# Patient Record
Sex: FEMALE | Race: WHITE | NOT HISPANIC OR LATINO | URBAN - METROPOLITAN AREA
[De-identification: names, ages, dates, MRNs, and addresses within clinical notes are randomized per-mention and may not be internally consistent; named-entity substitution may affect disease eponyms.]

---

## 2021-04-26 ENCOUNTER — OFFICE VISIT (OUTPATIENT)
Dept: URGENT CARE | Facility: CLINIC | Age: 36
End: 2021-04-26
Payer: COMMERCIAL

## 2021-04-26 VITALS
TEMPERATURE: 97.5 F | RESPIRATION RATE: 16 BRPM | HEART RATE: 78 BPM | HEIGHT: 64 IN | OXYGEN SATURATION: 100 % | DIASTOLIC BLOOD PRESSURE: 72 MMHG | SYSTOLIC BLOOD PRESSURE: 104 MMHG | BODY MASS INDEX: 24.92 KG/M2 | WEIGHT: 146 LBS

## 2021-04-26 DIAGNOSIS — L25.5 DERMATITIS DUE TO PLANTS, INCLUDING POISON IVY, SUMAC, AND OAK: Primary | ICD-10-CM

## 2021-04-26 PROCEDURE — 99203 OFFICE O/P NEW LOW 30 MIN: CPT | Performed by: FAMILY MEDICINE

## 2021-04-26 RX ORDER — TRIAMCINOLONE ACETONIDE 5 MG/G
CREAM TOPICAL
Qty: 30 G | Refills: 0 | Status: SHIPPED | OUTPATIENT
Start: 2021-04-26

## 2021-04-26 RX ORDER — CEPHALEXIN 500 MG/1
500 CAPSULE ORAL EVERY 12 HOURS SCHEDULED
Qty: 14 CAPSULE | Refills: 0 | Status: SHIPPED | OUTPATIENT
Start: 2021-04-26 | End: 2021-05-03

## 2021-04-26 RX ORDER — PREDNISONE 20 MG/1
TABLET ORAL
COMMUNITY
Start: 2021-04-21

## 2021-04-26 NOTE — PATIENT INSTRUCTIONS
1  Plant dermatitis of the right forearm   - continue and complete the oral Prednisone as prescribed   - topical Triamcinolone cream prescribed, apply to the affected area twice daily for no longer than 1 week   - may apply topical Neosporin to the site  - may take oral Benadryl at bedtime as needed for itching  - Keflex (antibiotic) prescribed to help prevent infection of the affected area   - gently wash the area with soap and water daily and keep clean   - follow up w/ PCP for re-check in 3-5 days   - if symptoms acutely worsen/spread, or any new symptoms present, patient is to be seen in the ER

## 2021-04-26 NOTE — PROGRESS NOTES
[facility]        NAME: James Roberts is a 43 y o  female  : 1985    MRN: [id number]  DATE: 2021  TIME: 1:14 PM    Assessment and Plan   Dermatitis due to plants, including poison ivy, sumac, and oak [L25 5]  1  Dermatitis due to plants, including poison ivy, sumac, and oak  cephalexin (KEFLEX) 500 mg capsule    triamcinolone (KENALOG) 0 5 % cream         Patient Instructions     Patient Instructions   1  Plant dermatitis of the right forearm   - continue and complete the oral Prednisone as prescribed   - topical Triamcinolone cream prescribed, apply to the affected area twice daily for no longer than 1 week   - may apply topical Neosporin to the site  - may take oral Benadryl at bedtime as needed for itching  - Keflex (antibiotic) prescribed to help prevent infection of the affected area   - gently wash the area with soap and water daily and keep clean   - follow up w/ PCP for re-check in 3-5 days   - if symptoms acutely worsen/spread, or any new symptoms present, patient is to be seen in the ER  Follow up with PCP in 3-5 days  Proceed to  ER if symptoms worsen  Chief Complaint     Chief Complaint   Patient presents with    Rash     poison ivy on right forearm         History of Present Illness       42 yo female presents for a rash on her right forearm that has been present for 1 week  She states the rash began the day after she was working in her garden and came in contact with [address]  She states she was seen by a telemedicine doctor the next day and was prescribed a course of oral steroids, 40 mg daily x 4 days, and 20 mg daily x 4 days  She has 1 day worth of steroids left  She states she notes no improvement in the rash and feels it is worsening  She states the rash is red, itchy, and oozing a clear yellowish fluid  She also states the area feels warm and is mildly painful  She denies any drainage of pus from the site  No fever/chills   No numbness/tingling or weakness of the arm  She is able to move the arm in full ROM  She has no known medication allergies  She denies any chance of pregnancy  Review of Systems   Review of Systems   Constitutional: Negative  HENT: Negative  Eyes: Negative  Respiratory: Negative  Cardiovascular: Negative  Gastrointestinal: Negative  Musculoskeletal: Negative  Skin:        As noted in HPI   Allergic/Immunologic: Negative  Neurological: Negative  Hematological: Negative  Current Medications       Current Outpatient Medications:     Norethindrone Acet-Ethinyl Est (AUROVELA 1/20 PO), Take by mouth, Disp: , Rfl:     predniSONE 20 mg tablet, TAKE 2 TABLETS BY MOUTH EVERY DAY FOR 4 DAYS THEN TAKE 1 TABLET BY MOUTH EVERY DAY FOR 4 DAYS, Disp: , Rfl:     cephalexin (KEFLEX) 500 mg capsule, Take 1 capsule (500 mg total) by mouth every 12 (twelve) hours for 7 days, Disp: 14 capsule, Rfl: 0    triamcinolone (KENALOG) 0 5 % cream, Apply topically to the affected area twice daily for 1 week, Disp: 30 g, Rfl: 0    Current Allergies     Allergies as of 04/26/2021    (No Known Allergies)            The following portions of the patient's history were reviewed and updated as appropriate: allergies, current medications, past family history, past medical history, past social history, past surgical history and problem list      Past Medical History:   Diagnosis Date    Patient denies medical problems        Past Surgical History:   Procedure Laterality Date    NO PAST SURGERIES         Family History   Problem Relation Age of Onset    No Known Problems Mother     No Known Problems Father          Medications have been verified  Objective   /72 (BP Location: Right arm, Patient Position: Sitting, Cuff Size: Standard)   Pulse 78   Temp 97 5 °F (36 4 °C) (Tympanic)   Resp 16   Ht 5' 4" (1 626 m)   Wt 66 2 kg (146 lb)   SpO2 100%   BMI 25 06 kg/m²   No LMP recorded         Physical Exam Physical Exam  Vitals signs and nursing note reviewed  Constitutional:       General: She is awake  She is not in acute distress  Appearance: Normal appearance  She is well-developed, well-groomed and normal weight  She is not ill-appearing, toxic-appearing or diaphoretic  Cardiovascular:      Rate and Rhythm: Normal rate  Pulses: Normal pulses  Pulmonary:      Effort: Pulmonary effort is normal  No tachypnea, accessory muscle usage or respiratory distress  Musculoskeletal: Normal range of motion  General: No swelling, tenderness, deformity or signs of injury  Skin:     General: Skin is warm  Capillary Refill: Capillary refill takes less than 2 seconds  Coloration: Skin is not pale  Findings: Erythema and rash present  No abscess, bruising, ecchymosis or laceration  Comments: There is a localized rash present on the volar surface of the right forearm  Rash consists of vesicular lesions overlying an erythematous base  There is mild clear to yellow fluid oozing noted from the lesions  The skin is warm and mildly tender to touch  No pus like drainage  No abscess  No bruising or hematomas  Neurological:      Mental Status: She is alert and oriented to person, place, and time  Mental status is at baseline  Psychiatric:         Attention and Perception: Attention and perception normal          Mood and Affect: Mood and affect normal          Speech: Speech normal          Behavior: Behavior normal  Behavior is cooperative  Thought Content:  Thought content normal          Cognition and Memory: Cognition and memory normal          Judgment: Judgment normal